# Patient Record
Sex: FEMALE | Race: WHITE | NOT HISPANIC OR LATINO | URBAN - METROPOLITAN AREA
[De-identification: names, ages, dates, MRNs, and addresses within clinical notes are randomized per-mention and may not be internally consistent; named-entity substitution may affect disease eponyms.]

---

## 2021-01-25 ENCOUNTER — OFFICE (OUTPATIENT)
Dept: URBAN - METROPOLITAN AREA CLINIC 11 | Facility: CLINIC | Age: 56
End: 2021-01-25

## 2021-01-25 VITALS
SYSTOLIC BLOOD PRESSURE: 144 MMHG | HEART RATE: 89 BPM | OXYGEN SATURATION: 97 % | HEIGHT: 63 IN | DIASTOLIC BLOOD PRESSURE: 78 MMHG | WEIGHT: 176 LBS

## 2021-01-25 DIAGNOSIS — R19.5 OTHER FECAL ABNORMALITIES: ICD-10-CM

## 2021-01-25 PROCEDURE — 99204 OFFICE O/P NEW MOD 45 MIN: CPT | Performed by: INTERNAL MEDICINE

## 2021-01-25 RX ORDER — SODIUM SULFATE, POTASSIUM SULFATE, MAGNESIUM SULFATE 17.5; 3.13; 1.6 G/ML; G/ML; G/ML
SOLUTION, CONCENTRATE ORAL
Qty: 1 | Refills: 0 | Status: ACTIVE
Start: 2021-01-25

## 2021-01-25 NOTE — SERVICENOTES
We discussed her heme positive stools and reviewed a dif dx noting her normal CBC.  She will need a colonoscopy for further evaluation.  We also discussed her hx of ovarian CA with surgery (which can cause adhesion issues and difficulties with colonoscopy) and hx of melanoma.  D/w pt and .

## 2021-01-25 NOTE — SERVICEHPINOTES
She presents for evaluation of a heme positive stool.  She has not had any noted bleeding but did note that she was constipated at time of the stool testing about 6 months ago.  She has been looking for bleeding and has not seen any.  She stated that her current stool pattern is one normal stool a day and sometimes two.  She has not had other abdominal pain difficulties or n/v.  She has not had reflux issues. She has not had issues with dysphagia.  She had no particular GI c/o today.Her grandmother had ovarian cancer but may have had colon cancer. She has not had a prior colonoscopy.She has a hx of melanoma (mid chest and left leg).  She had ovarian cancer about 7 years ago and was treated with surgery and chemo.  She was not anemic and had normal indices on her CBC (noted in her chart review from her PCP).